# Patient Record
Sex: FEMALE | Race: WHITE | NOT HISPANIC OR LATINO | Employment: PART TIME | URBAN - NONMETROPOLITAN AREA
[De-identification: names, ages, dates, MRNs, and addresses within clinical notes are randomized per-mention and may not be internally consistent; named-entity substitution may affect disease eponyms.]

---

## 2017-05-16 ENCOUNTER — OFFICE VISIT (OUTPATIENT)
Dept: OBSTETRICS AND GYNECOLOGY | Facility: CLINIC | Age: 22
End: 2017-05-16

## 2017-05-16 VITALS
SYSTOLIC BLOOD PRESSURE: 124 MMHG | DIASTOLIC BLOOD PRESSURE: 82 MMHG | BODY MASS INDEX: 38.98 KG/M2 | WEIGHT: 220 LBS | HEIGHT: 63 IN

## 2017-05-16 DIAGNOSIS — N92.2 EXCESSIVE MENSTRUATION AT PUBERTY: Primary | ICD-10-CM

## 2017-05-16 PROCEDURE — 99202 OFFICE O/P NEW SF 15 MIN: CPT | Performed by: PHYSICIAN ASSISTANT

## 2017-05-16 RX ORDER — POLYETHYLENE GLYCOL 3350 17 G/17G
POWDER, FOR SOLUTION ORAL
COMMUNITY
Start: 2011-09-16

## 2017-05-16 RX ORDER — ACETAMINOPHEN 325 MG/1
650 TABLET ORAL EVERY 6 HOURS
COMMUNITY
Start: 2013-11-01

## 2017-05-16 RX ORDER — OXYBUTYNIN CHLORIDE 5 MG/1
5 TABLET ORAL
COMMUNITY
Start: 2017-04-27 | End: 2021-03-02 | Stop reason: ALTCHOICE

## 2017-05-16 RX ORDER — IBUPROFEN 200 MG
400 TABLET ORAL
COMMUNITY

## 2018-05-18 ENCOUNTER — TELEPHONE (OUTPATIENT)
Dept: OBSTETRICS AND GYNECOLOGY | Facility: CLINIC | Age: 23
End: 2018-05-18

## 2018-05-18 NOTE — TELEPHONE ENCOUNTER
----- Message from Miladys Taylor sent at 5/18/2018 11:23 AM EDT -----  Contact: patient   This is Jazmin's patient. Patient is requesting a refill on her birth control.    She is scheduled for her annual on 06/20/2018.    [facility] --- [facility] - [place], [place] - [address] -    Patient's contact, [phone]

## 2018-09-11 ENCOUNTER — OFFICE VISIT (OUTPATIENT)
Dept: OBSTETRICS AND GYNECOLOGY | Facility: CLINIC | Age: 23
End: 2018-09-11

## 2018-09-11 VITALS — SYSTOLIC BLOOD PRESSURE: 122 MMHG | DIASTOLIC BLOOD PRESSURE: 82 MMHG | HEIGHT: 63 IN

## 2018-09-11 DIAGNOSIS — Z87.42 HISTORY OF MENORRHAGIA: ICD-10-CM

## 2018-09-11 DIAGNOSIS — Z76.0 ENCOUNTER FOR MEDICATION REFILL: Primary | ICD-10-CM

## 2018-09-11 PROCEDURE — 99212 OFFICE O/P EST SF 10 MIN: CPT | Performed by: PHYSICIAN ASSISTANT

## 2018-09-11 NOTE — PROGRESS NOTES
Subjective   Chief Complaint   Patient presents with   • Contraception     Needs a refill on birth control; no complaints       Jayme Romeo is a 22 y.o. year old  presenting to be seen for refills of Aygestin  Patient using Aygestin 5 mg 1/2 tablet to suppress periods.  She has spina bifida. Originally started on Aygestin by gynecologist at [facility] to suppress heavy menses as could not take care of hygiene issues on menses  She has no bleeding on Aygestin  Patient has never been sexually active   She has no complaints or concerns today     Past Medical History:   Diagnosis Date   • Spina bifida (CMS/[facility])         Current Outpatient Prescriptions:   •  acetaminophen (TYLENOL) 325 MG tablet, 650 mg Every 6 (Six) Hours., Disp: , Rfl:   •  gentamicin (GARAMYCIN) 0.9 MG/ML, Infuse 90 mg into a venous catheter Every 8 (Eight) Hours., Disp: , Rfl:   •  ibuprofen (ADVIL,MOTRIN) 200 MG tablet, 400 mg., Disp: , Rfl:   •  norethindrone (AYGESTIN) 5 MG tablet, Take one half tablet daily, Disp: 30 tablet, Rfl: 6  •  oxybutynin (DITROPAN) 5 MG tablet, 5 mg., Disp: , Rfl:   •  polyethylene glycol (MIRALAX) powder, Take  by mouth. Take 1 capful in at least 8 ounces of fluid a day.  You may adjust the amount of Miralax to achieve goal of at least one daily soft bowel movement., Disp: , Rfl:   •  Sennosides 15 MG chewable tablet, 15 mg., Disp: , Rfl:    Allergies   Allergen Reactions   • Latex Rash     precautions   • Minocycline Rash      Past Surgical History:   Procedure Laterality Date   • BACK SURGERY     • MITROFANOFF PROCEDURE     • OTHER SURGICAL HISTORY      heel cords   • SHUNT REVISION     • SPINAL CORD UNTETHERING        Social History     Social History   • Marital status: Single     Spouse name: N/A   • Number of children: N/A   • Years of education: N/A     Occupational History   • Not on file.     Social History Main Topics   • Smoking status: Never Smoker   • Smokeless tobacco: Not  "on file   • Alcohol use No   • Drug use: No   • Sexual activity: No     Other Topics Concern   • Not on file     Social History Narrative   • No narrative on file      Family History   Problem Relation Age of Onset   • Cancer Father    • Hypertension Father    • Hypertension Mother    • Hyperlipidemia Paternal Grandfather    • Breast cancer Maternal Grandmother        Review of Systems   Constitutional: Negative.    Gastrointestinal: Negative.    Genitourinary: Negative.            Objective   /82   Ht 160 cm (63\")   Breastfeeding? No     Physical Exam   Constitutional: She appears well-developed and well-nourished. She is cooperative.   In wheelchair   Eyes: Conjunctivae and lids are normal.   Neck: No thyroid mass and no thyromegaly present.   Cardiovascular: Normal rate, regular rhythm and normal heart sounds.    Pulmonary/Chest: Effort normal and breath sounds normal.   Abdominal: Soft. Normal appearance. There is no tenderness.   Neurological: She is alert.   Skin: Skin is warm and dry.   Psychiatric: She has a normal mood and affect. Her behavior is normal.            Assessment and Plan  Jayme was seen today for contraception.    Diagnoses and all orders for this visit:    Encounter for medication refill    History of menorrhagia    Other orders  -     norethindrone (AYGESTIN) 5 MG tablet; Take one half tablet daily      There are no Patient Instructions on file for this visit.           This note was electronically signed.    Marysol Santos PA-C   September 11, 2018  "

## 2019-10-08 ENCOUNTER — TELEPHONE (OUTPATIENT)
Dept: OBSTETRICS AND GYNECOLOGY | Facility: CLINIC | Age: 24
End: 2019-10-08

## 2019-10-08 NOTE — TELEPHONE ENCOUNTER
----- Message from Sanjana Albarran sent at 10/7/2019  4:16 PM EDT -----  Contact: Patient's mother Isis  Patient is scheduled for her annual in November, mom is asking if a refill of her birth control can be sent in today to [facility].

## 2019-11-11 ENCOUNTER — OFFICE VISIT (OUTPATIENT)
Dept: OBSTETRICS AND GYNECOLOGY | Facility: CLINIC | Age: 24
End: 2019-11-11

## 2019-11-11 VITALS
DIASTOLIC BLOOD PRESSURE: 70 MMHG | WEIGHT: 250 LBS | SYSTOLIC BLOOD PRESSURE: 118 MMHG | HEIGHT: 64 IN | BODY MASS INDEX: 42.68 KG/M2

## 2019-11-11 DIAGNOSIS — Z76.0 ENCOUNTER FOR MEDICATION REFILL: ICD-10-CM

## 2019-11-11 DIAGNOSIS — Z00.00 ANNUAL PHYSICAL EXAM: Primary | ICD-10-CM

## 2019-11-11 DIAGNOSIS — Z87.42 HISTORY OF MENORRHAGIA: ICD-10-CM

## 2019-11-11 PROCEDURE — 99212 OFFICE O/P EST SF 10 MIN: CPT | Performed by: PHYSICIAN ASSISTANT

## 2019-11-11 NOTE — PROGRESS NOTES
Subjective   Chief Complaint   Patient presents with   • Gynecologic Exam     No pap; requesting a refill Aygestin, no complaints       Jayme Romeo is a 24 y.o. year old  presenting to be seen for refills of Aygestin.  Patient using Aygestin 5 mg 1/2 tablet daily to suppress menses.  Was originally started on Aygestin at [facility] to suppress heavy menses due to spina bifida and hygiene issues.  Patient has no bleeding on Aygestin and desires to continue  She has never been sexually active        Past Medical History:   Diagnosis Date   • Spina bifida (CMS/[facility])         Current Outpatient Medications:   •  acetaminophen (TYLENOL) 325 MG tablet, 650 mg Every 6 (Six) Hours., Disp: , Rfl:   •  gentamicin (GARAMYCIN) 0.9 MG/ML, Infuse 90 mg into a venous catheter Every 8 (Eight) Hours., Disp: , Rfl:   •  ibuprofen (ADVIL,MOTRIN) 200 MG tablet, 400 mg., Disp: , Rfl:   •  norethindrone (AYGESTIN) 5 MG tablet, Take one half tablet daily, Disp: 30 tablet, Rfl: 7  •  oxybutynin (DITROPAN) 5 MG tablet, 5 mg., Disp: , Rfl:   •  polyethylene glycol (MIRALAX) powder, Take  by mouth. Take 1 capful in at least 8 ounces of fluid a day.  You may adjust the amount of Miralax to achieve goal of at least one daily soft bowel movement., Disp: , Rfl:   •  Sennosides 15 MG chewable tablet, 15 mg., Disp: , Rfl:    Allergies   Allergen Reactions   • Latex Rash     precautions  precautions  precautions   • Minocycline Rash      Past Surgical History:   Procedure Laterality Date   • BACK SURGERY     • MITROFANOFF PROCEDURE     • OTHER SURGICAL HISTORY      heel cords   • SHUNT REVISION     • SPINAL CORD UNTETHERING        Social History     Socioeconomic History   • Marital status: Single     Spouse name: Not on file   • Number of children: Not on file   • Years of education: Not on file   • Highest education level: Not on file   Tobacco Use   • Smoking status: Never Smoker   • Smokeless tobacco: Never Used  "  Substance and Sexual Activity   • Alcohol use: No   • Drug use: No   • Sexual activity: No     Birth control/protection: OCP      Family History   Problem Relation Age of Onset   • Cancer Father    • Hypertension Father    • Hypertension Mother    • Hyperlipidemia Paternal Grandfather    • Breast cancer Maternal Grandmother        Review of Systems   Constitutional: Negative.    Gastrointestinal: Negative.    Genitourinary: Negative for difficulty urinating, dysuria, menstrual problem, pelvic pain, vaginal bleeding and vaginal discharge.           Objective   /70   Ht 162.6 cm (64\")   Wt 113 kg (250 lb)   Breastfeeding? No   BMI 42.91 kg/m²     Physical Exam   Constitutional: She is cooperative. No distress.   In wheelchair  Pleasant talkative   Eyes: Conjunctivae, EOM and lids are normal.   Neck: No thyroid mass and no thyromegaly present.   Cardiovascular: Normal rate, regular rhythm and normal heart sounds.   Pulmonary/Chest: Effort normal and breath sounds normal.   Abdominal: Soft. Normal appearance. There is no hepatosplenomegaly. There is no tenderness. There is no rigidity and no guarding.   Genitourinary:   Genitourinary Comments: deferred   Neurological: She is alert.   Skin: Skin is warm and dry.   Psychiatric: She has a normal mood and affect. Her behavior is normal.            Assessment and Plan  Jayme was seen today for gynecologic exam.    Diagnoses and all orders for this visit:    Annual physical exam    History of menorrhagia    Encounter for medication refill    Other orders  -     norethindrone (AYGESTIN) 5 MG tablet; Take one half tablet daily      Patient Instructions   RTO one year or prn             This note was electronically signed.    Marysol Santos PA-C   November 11, 2019  "

## 2021-03-02 ENCOUNTER — OFFICE VISIT (OUTPATIENT)
Dept: OBSTETRICS AND GYNECOLOGY | Facility: CLINIC | Age: 26
End: 2021-03-02

## 2021-03-02 VITALS
BODY MASS INDEX: 44.3 KG/M2 | WEIGHT: 250 LBS | DIASTOLIC BLOOD PRESSURE: 84 MMHG | HEIGHT: 63 IN | SYSTOLIC BLOOD PRESSURE: 102 MMHG

## 2021-03-02 DIAGNOSIS — Z87.42 HISTORY OF MENORRHAGIA: ICD-10-CM

## 2021-03-02 DIAGNOSIS — Z76.0 ENCOUNTER FOR MEDICATION REFILL: ICD-10-CM

## 2021-03-02 DIAGNOSIS — Z00.00 ANNUAL PHYSICAL EXAM: Primary | ICD-10-CM

## 2021-03-02 PROCEDURE — 99395 PREV VISIT EST AGE 18-39: CPT | Performed by: PHYSICIAN ASSISTANT

## 2021-03-02 NOTE — PROGRESS NOTES
Subjective   Chief Complaint   Patient presents with   • Gynecologic Exam     Annual with no Pap smear       Jayme Romeo is a 25 y.o. year old  presenting to be seen for annual exam.  Patient reports no problems or concerns  She is using Aygestin 5 mg 1/2 tablet to suppress menses. Patient has spina bifida. Wheelchair bound. Has been on Aygestin since teen years and started at [facility].  Has never been sexually active    Past Medical History:   Diagnosis Date   • Spina bifida (CMS/HCC)         Current Outpatient Medications:   •  acetaminophen (TYLENOL) 325 MG tablet, 650 mg Every 6 (Six) Hours., Disp: , Rfl:   •  gentamicin (GARAMYCIN) 0.9 MG/ML, Infuse 90 mg into a venous catheter Every 8 (Eight) Hours., Disp: , Rfl:   •  ibuprofen (ADVIL,MOTRIN) 200 MG tablet, 400 mg., Disp: , Rfl:   •  norethindrone (AYGESTIN) 5 MG tablet, Take one half tablet daily, Disp: 30 tablet, Rfl: 12  •  polyethylene glycol (MIRALAX) powder, Take  by mouth. Take 1 capful in at least 8 ounces of fluid a day.  You may adjust the amount of Miralax to achieve goal of at least one daily soft bowel movement., Disp: , Rfl:   •  Sennosides 15 MG chewable tablet, 15 mg., Disp: , Rfl:    Allergies   Allergen Reactions   • Latex Rash     precautions  precautions  precautions   • Minocycline Rash   • Minocycline Hcl Rash      Past Surgical History:   Procedure Laterality Date   • BACK SURGERY     • MITROFANOFF PROCEDURE     • OTHER SURGICAL HISTORY      heel cords   • SHUNT REVISION     • SPINAL CORD UNTETHERING        Social History     Socioeconomic History   • Marital status: Single     Spouse name: Not on file   • Number of children: Not on file   • Years of education: Not on file   • Highest education level: Not on file   Tobacco Use   • Smoking status: Never Smoker   • Smokeless tobacco: Never Used   Vaping Use   • Vaping Use: Never used   Substance and Sexual Activity   • Alcohol use: No   • Drug use: No   •  "Sexual activity: Never     Birth control/protection: OCP      Family History   Problem Relation Age of Onset   • Cancer Father    • Hypertension Father    • Hypertension Mother    • Hyperlipidemia Paternal Grandfather    • Breast cancer Maternal Grandmother        Review of Systems   Constitutional: Negative for chills, diaphoresis and fever.   Gastrointestinal: Negative.    Genitourinary: Negative for dysuria, pelvic pain, vaginal bleeding and vaginal discharge.   All other systems reviewed and are negative.          Objective   /84   Ht 160 cm (63\")   Wt 113 kg (250 lb)   Breastfeeding No   BMI 44.29 kg/m²     Physical Exam  Constitutional:       Appearance: Normal appearance. She is well-groomed.      Comments: Wheelchair bound   Eyes:      General: Lids are normal.      Extraocular Movements: Extraocular movements intact.      Conjunctiva/sclera: Conjunctivae normal.   Neck:      Musculoskeletal: Neck supple.      Thyroid: No thyroid mass or thyromegaly.   Cardiovascular:      Rate and Rhythm: Normal rate and regular rhythm.      Heart sounds: Normal heart sounds.   Pulmonary:      Effort: Pulmonary effort is normal.      Breath sounds: Normal breath sounds.   Chest:      Breasts: Breasts are symmetrical.         Right: No inverted nipple, mass, nipple discharge, skin change or tenderness.         Left: No inverted nipple, mass, nipple discharge, skin change or tenderness.   Abdominal:      General: Abdomen is protuberant. Bowel sounds are normal.      Palpations: Abdomen is soft.      Tenderness: There is no abdominal tenderness.   Genitourinary:     Comments: deferred  Skin:     General: Skin is warm and dry.      Findings: No lesion or rash.   Neurological:      General: No focal deficit present.      Mental Status: She is alert and oriented to person, place, and time.   Psychiatric:         Attention and Perception: Attention normal.         Mood and Affect: Mood normal.         Speech: Speech " normal.         Behavior: Behavior is cooperative.         Thought Content: Thought content normal.              Assessment and Plan  Diagnoses and all orders for this visit:    1. Annual physical exam (Primary)    2. History of menorrhagia    3. Encounter for medication refill    Other orders  -     norethindrone (AYGESTIN) 5 MG tablet; Take one half tablet daily  Dispense: 30 tablet; Refill: 12      Patient Instructions   Follow up one year or prn  Encourage self breast exam monthly             This note was electronically signed.    Marysol Santos PA-C   March 12, 2021

## 2021-03-23 ENCOUNTER — BULK ORDERING (OUTPATIENT)
Dept: CASE MANAGEMENT | Facility: OTHER | Age: 26
End: 2021-03-23

## 2021-03-23 DIAGNOSIS — Z23 IMMUNIZATION DUE: ICD-10-CM

## 2024-01-03 ENCOUNTER — OFFICE VISIT (OUTPATIENT)
Dept: OBSTETRICS AND GYNECOLOGY | Facility: CLINIC | Age: 29
End: 2024-01-03
Payer: MEDICARE

## 2024-01-03 VITALS — BODY MASS INDEX: 44.29 KG/M2 | HEIGHT: 63 IN

## 2024-01-03 DIAGNOSIS — Z87.42 HISTORY OF MENORRHAGIA: ICD-10-CM

## 2024-01-03 DIAGNOSIS — N92.1 BREAKTHROUGH BLEEDING: ICD-10-CM

## 2024-01-03 DIAGNOSIS — Q05.9 SPINA BIFIDA, UNSPECIFIED HYDROCEPHALUS PRESENCE, UNSPECIFIED SPINAL REGION: ICD-10-CM

## 2024-01-03 DIAGNOSIS — Z76.0 MEDICATION REFILL: ICD-10-CM

## 2024-01-03 DIAGNOSIS — Z00.00 ENCOUNTER FOR ANNUAL PHYSICAL EXAMINATION EXCLUDING GYNECOLOGICAL EXAMINATION IN A PATIENT OLDER THAN 17 YEARS: Primary | ICD-10-CM

## 2024-01-03 RX ORDER — OXYBUTYNIN CHLORIDE 15 MG/1
TABLET, EXTENDED RELEASE ORAL
COMMUNITY

## 2024-01-03 NOTE — PROGRESS NOTES
Subjective   Chief Complaint   Patient presents with    Annual Exam     No pap, requesting a refill on birth control. C/O break through bleeding with current pills       Jayme Romeo is a 28 y.o. year old  presenting to be seen for her annual gynecological exam.   Patient has been on Aygestin 5 mg taking 1/2 tablet daily for approximately 10-11 years for suppression of menses.  Aygestin was originally started at [facility].  Patient has spina bifida and is wheelchair-bound.  Aygestin was started to suppress menses due to difficulty with hygiene on menses.  Reports that for the last year or so she has had random off and on light bleeding with the Aygestin.  This occurs maybe every 3 months or so and is very light for about 2 days.  She is not having any pelvic pain or cramping.  She has never been sexually active.  She wants to continue to suppress menses.    Past Medical History:   Diagnosis Date    Spina bifida         Current Outpatient Medications:     acetaminophen (TYLENOL) 325 MG tablet, 2 tablets Every 6 (Six) Hours., Disp: , Rfl:     ibuprofen (ADVIL,MOTRIN) 200 MG tablet, 2 tablets., Disp: , Rfl:     norethindrone (AYGESTIN) 5 MG tablet, Take one half tablet daily, Disp: 30 tablet, Rfl: 12    oxybutynin XL (DITROPAN XL) 15 MG 24 hr tablet, TAKE ONE TABLET BY MOUTH DIALY, Disp: , Rfl:     polyethylene glycol (MIRALAX) powder, Take  by mouth. Take 1 capful in at least 8 ounces of fluid a day.  You may adjust the amount of Miralax to achieve goal of at least one daily soft bowel movement., Disp: , Rfl:     Sennosides 15 MG chewable tablet, 1 each., Disp: , Rfl:    Allergies   Allergen Reactions    Minocycline Rash, Anaphylaxis, Hives and Swelling    Latex Rash and Other (See Comments)     precautions    Latex precaution   precautions   precautions   precautions   precautions   precautions   precautions    Minocycline Hcl Rash      Past Surgical History:   Procedure Laterality  "Date    BACK SURGERY      MITROFANOFF PROCEDURE      OTHER SURGICAL HISTORY      heel cords    SHUNT REVISION      SPINAL CORD UNTETHERING        Social History     Socioeconomic History    Marital status: Single   Tobacco Use    Smoking status: Never    Smokeless tobacco: Never   Vaping Use    Vaping Use: Never used   Substance and Sexual Activity    Alcohol use: No    Drug use: No    Sexual activity: Never     Birth control/protection: OCP, Abstinence      Family History   Problem Relation Age of Onset    Cancer Father     Hypertension Father     Hypertension Mother     Hyperlipidemia Paternal Grandfather     Breast cancer Maternal Grandmother        Review of Systems   Constitutional:  Negative for chills, diaphoresis and fever.   Gastrointestinal:  Negative for constipation, diarrhea, nausea and vomiting.   Genitourinary:  Positive for vaginal bleeding. Negative for difficulty urinating, dysuria, menstrual problem, pelvic pain and vaginal discharge.           Objective   Ht 160 cm (63\")   LMP 12/03/2023 (Approximate)   BMI 44.29 kg/m²     Physical Exam  Constitutional:       Appearance: Normal appearance. She is well-developed and well-groomed. She is obese.   Eyes:      General: Lids are normal.      Extraocular Movements: Extraocular movements intact.      Conjunctiva/sclera: Conjunctivae normal.   Neck:      Thyroid: No thyroid mass.   Cardiovascular:      Rate and Rhythm: Normal rate and regular rhythm.      Heart sounds: Normal heart sounds.   Pulmonary:      Effort: Pulmonary effort is normal.      Breath sounds: Normal breath sounds.   Chest:   Breasts:     Breasts are symmetrical.      Right: No inverted nipple, mass, nipple discharge, skin change or tenderness.      Left: No inverted nipple, mass, nipple discharge, skin change or tenderness.   Abdominal:      General: Abdomen is protuberant.      Palpations: Abdomen is soft.      Tenderness: There is no abdominal tenderness.   Genitourinary:     " Comments: deferred  Musculoskeletal:      Cervical back: Normal range of motion and neck supple.   Skin:     General: Skin is warm and dry.      Findings: No bruising or lesion.   Neurological:      General: No focal deficit present.      Mental Status: She is alert and oriented to person, place, and time.   Psychiatric:         Attention and Perception: Attention normal.         Mood and Affect: Mood normal.         Speech: Speech normal.         Behavior: Behavior is cooperative.            Result Review :                   Assessment and Plan  Diagnoses and all orders for this visit:    1. Encounter for annual physical examination excluding gynecological examination in a patient older than 17 years (Primary)    2. History of menorrhagia    3. Medication refill    4. Spina bifida, unspecified hydrocephalus presence, unspecified spinal region    5. Breakthrough bleeding  -     US Pelvis Complete    Other orders  -     norethindrone (AYGESTIN) 5 MG tablet; Take one half tablet daily  Dispense: 30 tablet; Refill: 12      Patient Instructions   Self breast exam monthly  Breakthrough bleeding is most likely due to thinned endometrium with long-term Aygestin use.  However will have patient return for trans abdominal pelvic ultrasound for evaluation of endometrium.  May continue Aygestin 5 mg 1/2 tablet daily             This note was electronically signed.    Marysol Santos PA-C   January 4, 2024

## 2024-01-04 NOTE — PATIENT INSTRUCTIONS
Self breast exam monthly  Breakthrough bleeding is most likely due to thinned endometrium with long-term Aygestin use.  However will have patient return for trans abdominal pelvic ultrasound for evaluation of endometrium.  May continue Aygestin 5 mg 1/2 tablet daily

## 2024-01-19 ENCOUNTER — DOCUMENTATION (OUTPATIENT)
Dept: OBSTETRICS AND GYNECOLOGY | Facility: CLINIC | Age: 29
End: 2024-01-19
Payer: MEDICARE

## 2024-01-19 NOTE — PROGRESS NOTES
Patient informed of ultrasound results from 1/17/2024.  Uterus normal, endometrium 9 mm. Bilateral ovaries normal. No free fluid. Endometrium not excessively thickened but as taking 1/2 of Aygestin 5 mg will increase to full tablet Aygestin 5 mg. Repeat TVS to check endometrium in 4-6 months.

## 2024-07-18 ENCOUNTER — OFFICE VISIT (OUTPATIENT)
Dept: OBSTETRICS AND GYNECOLOGY | Facility: CLINIC | Age: 29
End: 2024-07-18
Payer: MEDICARE

## 2024-07-18 VITALS — DIASTOLIC BLOOD PRESSURE: 80 MMHG | HEIGHT: 63 IN | BODY MASS INDEX: 44.29 KG/M2 | SYSTOLIC BLOOD PRESSURE: 128 MMHG

## 2024-07-18 DIAGNOSIS — N92.1 BREAKTHROUGH BLEEDING: ICD-10-CM

## 2024-07-18 DIAGNOSIS — Z87.42 HISTORY OF MENORRHAGIA: Primary | ICD-10-CM

## 2024-07-18 DIAGNOSIS — N83.201 RIGHT OVARIAN CYST: ICD-10-CM

## 2024-07-18 RX ORDER — CYANOCOBALAMIN 1000 UG/ML
INJECTION, SOLUTION INTRAMUSCULAR; SUBCUTANEOUS
COMMUNITY
Start: 2024-03-28

## 2024-07-18 RX ORDER — NEEDLES, SAFETY 22GX1 1/2"
NEEDLE, DISPOSABLE MISCELLANEOUS
COMMUNITY
Start: 2024-07-01

## 2024-07-18 NOTE — PATIENT INSTRUCTIONS
Continue Aygestin 5 mg daily.  With endometrium measured at about 5 mm today, do not feel further ultrasound surveillance is necessary.  We discussed the simple 4 cm cyst on the right ovary.  Patient is not having any pelvic pain or cramping.  Cyst will most likely resolve spontaneously  Follow-up in 6 months or as needed.

## 2024-07-18 NOTE — PROGRESS NOTES
"Subjective   Chief Complaint   Patient presents with    Follow-up     Follow up TVS done today       Jayme Romeo is a 28 y.o. year old  presenting to be seen for follow-up on endometrial thickness.  Patient remains on Aygestin 5 mg daily for cycle suppression due to spina bifida and hygiene difficulty.  Usually remains amenorrheic but occasionally has some light spotting.  An ultrasound done 6 months ago due to breakthrough bleeding  had noted her endometrium to be slightly thickened at 9 mm.  Ultrasound done today is reviewed with patient.  Uterus normal. Endometrium 5.6 mm. Left ovary normal. Right ovary 4 cm simple cyst. No free fluid.  Patient is not experiencing any pelvic pain or cramping     Past Medical History:   Diagnosis Date    Spina bifida     Varicella N/A        Current Outpatient Medications:     acetaminophen (TYLENOL) 325 MG tablet, 2 tablets Every 6 (Six) Hours., Disp: , Rfl:     B-D TB SYRINGE 1CC/27GX1/2\" 27G X 1/2\" 1 ML misc, USE AS DIRECTED FOR 90 DAYS, Disp: , Rfl:     cyanocobalamin 1000 MCG/ML injection, INJECT ONE MILLILITER ONCE A MONTH, Disp: , Rfl:     ibuprofen (ADVIL,MOTRIN) 200 MG tablet, 2 tablets., Disp: , Rfl:     norethindrone (AYGESTIN) 5 MG tablet, Take one tablet daily, Disp: 30 tablet, Rfl: 12    oxybutynin XL (DITROPAN XL) 15 MG 24 hr tablet, TAKE ONE TABLET BY MOUTH DIALY, Disp: , Rfl:     polyethylene glycol (MIRALAX) powder, Take  by mouth. Take 1 capful in at least 8 ounces of fluid a day.  You may adjust the amount of Miralax to achieve goal of at least one daily soft bowel movement., Disp: , Rfl:     Sennosides 15 MG chewable tablet, 1 each., Disp: , Rfl:    Allergies   Allergen Reactions    Minocycline Rash, Anaphylaxis, Hives and Swelling    Latex Rash and Other (See Comments)     precautions    Latex precaution   precautions   precautions   precautions   precautions   precautions   precautions    Minocycline Hcl Rash      Past Surgical History: " "  Procedure Laterality Date    BACK SURGERY      MITROFANOFF PROCEDURE      OTHER SURGICAL HISTORY      heel cords    SHUNT REVISION      SPINAL CORD UNTETHERING      WISDOM TOOTH EXTRACTION  N/A      Social History     Socioeconomic History    Marital status: Single   Tobacco Use    Smoking status: Never    Smokeless tobacco: Never   Vaping Use    Vaping status: Never Used   Substance and Sexual Activity    Alcohol use: No    Drug use: No    Sexual activity: Never     Birth control/protection: OCP, Abstinence      Family History   Problem Relation Age of Onset    Cancer Father     Hypertension Father         N/A    Breast cancer Father     Hypertension Mother     Hyperlipidemia Paternal Grandfather     Breast cancer Maternal Grandmother        Review of Systems   Constitutional:  Negative for chills, diaphoresis and fever.   Gastrointestinal: Negative.    Genitourinary:  Negative for difficulty urinating, dysuria, menstrual problem and pelvic pain.           Objective   /80   Ht 160 cm (63\")   LMP 07/16/2024 (Approximate)   BMI 44.29 kg/m²     Physical Exam  Constitutional:       Appearance: Normal appearance. She is well-groomed.      Comments: In wheelchair   Eyes:      General: Lids are normal.      Extraocular Movements: Extraocular movements intact.      Conjunctiva/sclera: Conjunctivae normal.   Neurological:      General: No focal deficit present.      Mental Status: She is alert and oriented to person, place, and time.   Psychiatric:         Attention and Perception: Attention normal.         Mood and Affect: Mood normal.         Speech: Speech normal.         Behavior: Behavior is cooperative.            Result Review :           US Pelvis Complete (07/18/2024 15:37)         Assessment and Plan  Diagnoses and all orders for this visit:    1. History of menorrhagia (Primary)  -     US Pelvis Complete    2. Breakthrough bleeding  -     US Pelvis Complete    3. Right ovarian cyst      Patient " Instructions   Continue Aygestin 5 mg daily.  With endometrium measured at about 5 mm today, do not feel further ultrasound surveillance is necessary.  We discussed the simple 4 cm cyst on the right ovary.  Patient is not having any pelvic pain or cramping.  Cyst will most likely resolve spontaneously  Follow-up in 6 months or as needed.           This note was electronically signed.    Marysol Santos PA-C   July 18, 2024

## 2025-02-17 RX ORDER — NORETHINDRONE 5 MG/1
TABLET ORAL
Qty: 30 TABLET | Refills: 4 | Status: SHIPPED | OUTPATIENT
Start: 2025-02-17

## 2025-08-04 RX ORDER — NORETHINDRONE 5 MG/1
TABLET ORAL
Qty: 30 TABLET | Refills: 2 | Status: SHIPPED | OUTPATIENT
Start: 2025-08-04